# Patient Record
Sex: MALE | URBAN - METROPOLITAN AREA
[De-identification: names, ages, dates, MRNs, and addresses within clinical notes are randomized per-mention and may not be internally consistent; named-entity substitution may affect disease eponyms.]

---

## 2017-10-09 ENCOUNTER — OFFICE VISIT (OUTPATIENT)
Dept: FAMILY MEDICINE | Facility: CLINIC | Age: 42
End: 2017-10-09

## 2017-10-09 VITALS
BODY MASS INDEX: 32.67 KG/M2 | DIASTOLIC BLOOD PRESSURE: 84 MMHG | WEIGHT: 184.4 LBS | SYSTOLIC BLOOD PRESSURE: 120 MMHG | OXYGEN SATURATION: 97 % | TEMPERATURE: 98.4 F | RESPIRATION RATE: 14 BRPM | HEART RATE: 66 BPM | HEIGHT: 63 IN

## 2017-10-09 DIAGNOSIS — Z13.6 ENCOUNTER FOR LIPID SCREENING FOR CARDIOVASCULAR DISEASE: Primary | ICD-10-CM

## 2017-10-09 DIAGNOSIS — K21.9 GASTROESOPHAGEAL REFLUX DISEASE, ESOPHAGITIS PRESENCE NOT SPECIFIED: ICD-10-CM

## 2017-10-09 DIAGNOSIS — Z13.220 ENCOUNTER FOR LIPID SCREENING FOR CARDIOVASCULAR DISEASE: Primary | ICD-10-CM

## 2017-10-09 DIAGNOSIS — Z13.1 SCREENING FOR DIABETES MELLITUS: ICD-10-CM

## 2017-10-09 DIAGNOSIS — R07.9 CHEST PAIN, UNSPECIFIED TYPE: ICD-10-CM

## 2017-10-09 LAB
CHOLEST SERPL-MCNC: 185 MG/DL
HBA1C MFR BLD: 5.8 % (ref 4.3–6)
HDLC SERPL-MCNC: 33 MG/DL
LDLC SERPL CALC-MCNC: ABNORMAL MG/DL
NONHDLC SERPL-MCNC: 152 MG/DL
TRIGL SERPL-MCNC: 404 MG/DL

## 2017-10-09 NOTE — MR AVS SNAPSHOT
After Visit Summary   10/9/2017    Vini To    MRN: [id number]           Patient Information     Date Of Birth          1975        Visit Information        Provider Department      10/9/2017 7:45 PM Clinician, Junior Rowan [facility]        Today's Diagnoses     Encounter for lipid screening for cardiovascular disease    -  1    Screening for diabetes mellitus        Chest pain, unspecified type        Gastroesophageal reflux disease, esophagitis presence not specified          Care Instructions    Vamos a llamar usted con los resultados de cher laboratorios en 2-3 ferguson. Columba el medicamento que usted ha recibido lilian prescribido por el siguente mes. Regresa a la clinca mas temprano si tiene mas sintomas o el medicamento no ayuda con las sintomas.     En el 11 de noviembre, tenemos sylvie clinica de medicos oftalmologia. Por favor viene castillo noche si quiere un examen de cher ojos.           Follow-ups after your visit        Who to contact     Please call your clinic at [phone] to:    Ask questions about your health    Make or cancel appointments    Discuss your medicines    Learn about your test results    Speak to your doctor   If you have compliments or concerns about an experience at your clinic, or if you wish to file a complaint, please contact [facility] Patient Relations at [phone] or email us at [email or web address]         Additional Information About Your Visit        MyChart Information     [employer] is an electronic gateway that provides easy, online access to your medical records. With [employer], you can request a clinic appointment, read your test results, renew a prescription or communicate with your care team.     To sign up for [employer] visit the website at [email or web address]   You will be asked to enter the access code listed below, as well as some personal information. Please follow the directions to create  "your username and password.     Your access code is: [email or web address]  Expires: 2018  9:26 PM     Your access code will  in 90 days. If you need help or a new code, please contact your [facility] or call [phone] for assistance.        Care EveryWhere ID     This is your Care EveryWhere ID. This could be used by other organizations to access your [facility] medical records  [id number]        Your Vitals Were     Pulse Temperature Respirations Height Pulse Oximetry BMI (Body Mass Index)    66 98.4  F (36.9  C) (Oral) 14 5' 3.19\" (160.5 cm) 97% 32.47 kg/m2       Blood Pressure from Last 3 Encounters:   10/09/17 120/84    Weight from Last 3 Encounters:   10/09/17 184 lb 6.4 oz (83.6 kg)              We Performed the Following     HC FLU VAC PRESRV FREE QUAD SPLIT VIR 3+YRS IM     Hemoglobin A1c     Lipid panel reflex to direct LDL          Today's Medication Changes          These changes are accurate as of: 10/9/17  9:26 PM.  If you have any questions, ask your nurse or doctor.               Start taking these medicines.        Dose/Directions    omeprazole 20 MG CR capsule   Commonly known as:  priLOSEC   Used for:  Chest pain, unspecified type, Gastroesophageal reflux disease, esophagitis presence not specified   Started by:  Clinician, Pn Ump        Dose:  20 mg   Take 1 capsule (20 mg) by mouth daily   Quantity:  30 capsule   Refills:  0            Where to get your medicines      Some of these will need a paper prescription and others can be bought over the counter.  Ask your nurse if you have questions.     Bring a paper prescription for each of these medications     omeprazole 20 MG CR capsule                Primary Care Provider    None Specified       No primary provider on file.        Equal Access to Services     MELISSA BEEBE : beto Tejeda qaybta kaalmada adeegyada, waxay idiin hayaan adeeg kharash la'aan ah. So wa " [phone].    ATENCIÓN: Si carlos harley, tiene a quick disposición servicios gratuitos de asistencia lingüística. Corrie diaz [phone].    We comply with applicable federal civil rights laws and [place] laws. We do not discriminate on the basis of race, color, national origin, age, disability, sex, sexual orientation, or gender identity.            Thank you!     Thank you for choosing [facility]  for your care. Our goal is always to provide you with excellent care. Hearing back from our patients is one way we can continue to improve our services. Please take a few minutes to complete the written survey that you may receive in the mail after your visit with us. Thank you!             Your Updated Medication List - Protect others around you: Learn how to safely use, store and throw away your medicines at www.disposemymeds.org.          This list is accurate as of: 10/9/17  9:26 PM.  Always use your most recent med list.                   Brand Name Dispense Instructions for use Diagnosis    doxycycline 100 MG tablet    VIBRA-TABS    10 tablet    Take 1 tablet (100 mg) by mouth 2 times daily    Male infertility, unspecified       omeprazole 20 MG CR capsule    priLOSEC    30 capsule    Take 1 capsule (20 mg) by mouth daily    Chest pain, unspecified type, Gastroesophageal reflux disease, esophagitis presence not specified

## 2017-10-09 NOTE — Clinical Note
I have completed my note, please review, add tie in statement and close encounter.   Thank you for your help in the clinic!  Bertin Narayan

## 2017-10-10 RX ORDER — CALCIUM CARBONATE 500(1250)
1 TABLET ORAL DAILY
COMMUNITY
End: 2017-11-20 | Stop reason: DRUGHIGH

## 2017-10-10 NOTE — PROGRESS NOTES
"[facility] Progress Note    Chief complaint: Patient is experiencing pain in his chest. Also complained of irritation in neck and throat and chest associated with bad taste in his mouth.      Last date of full med (Med Refill only):    Subjective: Patient has had pressure in his chest that has lasted for up to 10 minutes 4-6 times over the past 7 months. This pain is not associated with shortness of breath and he rate it on as a 7 or 8 on a scale of 1-10. The pain mildly can radiate to his neck or shoulders and can happen at rest or while being active. Patient claims that drinking cold water generally calms the pain and pressure and it goes away within 10 minutes. Patient also gets bad taste in his mouth which seems like acid several times per week.    Condition 1: Chest Pain. Hard to differentiate between angina or GERD.     Current Outpatient Prescriptions   Medication Sig Dispense Refill     calcium carbonate (OS-TIFFANI 500 MG Kake. CA) 1250 MG tablet Take 1 tablet by mouth daily       omeprazole (PRILOSEC) 20 MG CR capsule Take 1 capsule (20 mg) by mouth daily 30 capsule 0     doxycycline (VIBRA-TABS) 100 MG tablet Take 1 tablet (100 mg) by mouth 2 times daily (Patient not taking: Reported on 10/10/2017) 10 tablet 0         Objective:   /84 (BP Location: Left arm, Patient Position: Sitting, Cuff Size: Adult Regular)  Pulse 66  Temp 98.4  F (36.9  C) (Oral)  Resp 14  Ht 5' 3.19\" (160.5 cm)  Wt 184 lb 6.4 oz (83.6 kg)  SpO2 97%  BMI 32.47 kg/m2    Assessment:     Condition 1- Chest pain  DTP: Needs Additional Therapy: untreated condition   Rationale: Patient is experiencing chest pain that is hard to differentiate between either being GERD or angina or both. Because the patient does not have worsening symptoms of angina and the pain only lasts ten minutes it was determined that the angina was not severe enough to be treated as an emergency. Because it was hard to differentiate between angina and GERD, " we choose to prescribe omeprazole as to hopefully differentiate between the two. Also a lipid panel was taken just in case to check and see if the patient may have diabetes.    Plan:  1. Prescribed omeprazole as to differentiate between GERD and angina for the patient. Will assess efficacy of treatment for the paitent's chest pain and assess if chest pain is associated to GERD, angina, or both. Will contact patient on Thursday to give results of lipid panel as well. Also, gave fast pass to patient for Thursday for nutrition just for general health information.    Pharmacy Follow-Up Plan (Method, Date, Parameters): Will follow-up with the patient by phone on Thursday to assess results of lipid panel and also safety and efficacy of omeprazole treatment of GERD. It is important to evaluate this efficacy as to differentiate between if the patient has GERD or angina or both. Based off of efficacy we will see if patients needs to be seen for angina treatment or stop GERD treatment. Patient may come in to clinic Thursday as well to use nutrition fast pass.    PharmCare Clinician: Bertin Narayan  Pharmacy Preceptor: Francisco Diaz    _____________________________  Preceptor Use Only:  In supervising the student, I have reviewed and verified the student's documentation and found it to be correct and complete.   Preceptor Signature:

## 2017-10-10 NOTE — NURSING NOTE
"Patient came in complaining of chest pain 4-6 times since march. 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 No current treatment for this problem. He rates this pain a 7 or 8 out of 10. Patient reports his pain as \"pressure\". He denies any SOB with this pain. Patient also experienced one episode of dizziness with pain in his head about 2 months ago. Has not experienced this pain since.     Gladys Wood, SN  "

## 2017-10-10 NOTE — PATIENT INSTRUCTIONS
Vamos a llamar usted con los resultados de cher laboratorios en 2-3 ferguson. Columba el medicamento que usted ha recibido lilian prescribido por el siguente mes. Regresa a la clinca mas temprano si tiene mas sintomas o el medicamento no ayuda con las sintomas.     En el 11 de noviembre, tenemos sylvie clinica de medicos oftalmologia. Por favor viene castillo noche si quiere un examen de cher ojos.

## 2017-10-11 LAB — LDLC SERPL DIRECT ASSAY-MCNC: 101 MG/DL
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
## 2017-10-12 NOTE — PROGRESS NOTES
Mr. To was called at 10:30 on 10/12/17 with his lab results. He was told all his lab values including the normal ranges. It was explained to him that the doctor and med clinician mostly noted his triglycerides are high. It was recommended that he use his fast pass to come to clinic and meet with nutrition to try and use lifestyle changes to lower his triglycerides and was determined that based on his labs, age, etc. he was not a good candidate for medications at this time. I reviewed the existing plan of returning in one month after use of the PPI to reevaluate for chest pain symptoms. The patient was asked if he understood and if he had any questions. He stated that he understood everything and had no questions.

## 2017-12-07 ENCOUNTER — TELEPHONE (OUTPATIENT)
Dept: FAMILY MEDICINE | Facility: CLINIC | Age: 42
End: 2017-12-07

## 2017-12-07 NOTE — TELEPHONE ENCOUNTER
----- Message from Ines Monet sent at 11/20/2017  9:06 PM CST -----  Regarding: f/u 12/04/17 Samoan Speaking  Summary: FC came in with chest pain that was present after eating and was following up from a visit on 10/9/17 with similar symptoms. Omeprazole 20 mg was stopped, ranitidine 150 mg two times a day and Tums three times a day as needed was initiated.     Follow-up:  1.  Determine if patient's heart burn and chest pain symptoms have decreased  2. Determine if patient has had any side effects from the ranitidine like pain, constipation, diarrhea or dizziness.  3. If the patient is not feeling better, he needs come in for an H. Pylori test and follow-up.